# Patient Record
Sex: FEMALE | Race: WHITE
[De-identification: names, ages, dates, MRNs, and addresses within clinical notes are randomized per-mention and may not be internally consistent; named-entity substitution may affect disease eponyms.]

---

## 2020-09-16 ENCOUNTER — HOSPITAL ENCOUNTER (EMERGENCY)
Dept: HOSPITAL 54 - ER | Age: 50
Discharge: HOME | End: 2020-09-16
Payer: MEDICAID

## 2020-09-16 VITALS — HEIGHT: 65 IN | BODY MASS INDEX: 31.32 KG/M2 | WEIGHT: 188 LBS

## 2020-09-16 VITALS — DIASTOLIC BLOOD PRESSURE: 69 MMHG | SYSTOLIC BLOOD PRESSURE: 119 MMHG

## 2020-09-16 DIAGNOSIS — R10.2: ICD-10-CM

## 2020-09-16 DIAGNOSIS — Z88.0: ICD-10-CM

## 2020-09-16 DIAGNOSIS — Z88.1: ICD-10-CM

## 2020-09-16 DIAGNOSIS — G89.29: ICD-10-CM

## 2020-09-16 DIAGNOSIS — H53.8: Primary | ICD-10-CM

## 2020-09-16 NOTE — NUR
"blurring of vision," seeing flashing lights x yesterday at 1800 pt states 
flashing light already resolved.  Patient a/ox4, breathing even and unlabored, 
no sob noted, needs attended. Kept comfortable.

## 2020-09-16 NOTE — NUR
Patient a/ox4, breathing even and unlabored, no sob noted. Patient discharged 
to home in stable condition. Written and verbal after care instructions given. 
Patient verbalizes understanding of instruction.

## 2021-12-13 ENCOUNTER — OFFICE (OUTPATIENT)
Dept: URBAN - METROPOLITAN AREA CLINIC 67 | Facility: CLINIC | Age: 51
End: 2021-12-13

## 2021-12-13 VITALS
WEIGHT: 185 LBS | SYSTOLIC BLOOD PRESSURE: 118 MMHG | TEMPERATURE: 97.8 F | DIASTOLIC BLOOD PRESSURE: 70 MMHG | HEIGHT: 65 IN

## 2021-12-13 DIAGNOSIS — R10.30 ABDOMINAL PAIN, OTHER OR MULTIPLE SITES: ICD-10-CM

## 2021-12-13 DIAGNOSIS — Z12.11 SCREENING FOR COLONIC NEOPLASIA: ICD-10-CM

## 2021-12-13 PROCEDURE — 99204 OFFICE O/P NEW MOD 45 MIN: CPT | Performed by: STUDENT IN AN ORGANIZED HEALTH CARE EDUCATION/TRAINING PROGRAM

## 2021-12-13 NOTE — SERVICENOTES
This note was dictated with Dragon dictation software and any errors or typos are unintentional, [facility] with CS, plenvu

## 2021-12-13 NOTE — SERVICEHPINOTES
I had the pleasure seeing the patient in consultation for colon cancer screening and for right pelvic pain.  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  She has been leukemia free for many years and has not had any seizures since that one episode and has been off of seizure medications for years.  She denies any overt evidence of bleeding or unintentional weight loss or changes in bowel function.  She denies family history of colon cancer.  She has noticed that after 2 episodes of sneezing she developed right lower pelvic pain intermittently only when she sneezes.  She does not otherwise have the pain regularly.  She did describes the workup for hernias and abdominal imaging in the past which has been negative.  She reports an upcoming pelvic CT scan.